# Patient Record
Sex: MALE | Race: WHITE | URBAN - METROPOLITAN AREA
[De-identification: names, ages, dates, MRNs, and addresses within clinical notes are randomized per-mention and may not be internally consistent; named-entity substitution may affect disease eponyms.]

---

## 2024-11-05 ENCOUNTER — TELEPHONE (OUTPATIENT)
Dept: INTERNAL MEDICINE CLINIC | Age: 49
End: 2024-11-05

## 2024-11-05 NOTE — TELEPHONE ENCOUNTER
----- Message from Inessa ANN sent at 11/4/2024  2:51 PM EST -----  Regarding: [facility] Appointment Request  [facility] Appointment Request    Patient needs appointment for [facility] Appointment Type: New Patient.    Patient Requested Dates(s): Sooner availability  Patient Requested Time: Early morning or later afternoon   Provider Name: Dr Javier Abebe MD    Reason for Appointment Request: New Patient - Requested Provider unavailable    Patient is requesting to set an appointment to Dr. Abebe and get establish  --------------------------------------------------------------------------------------------------------------------------    Relationship to Patient: Self     Call Back Information: OK to leave message on voicemail  Preferred Call Back Number: Phone [phone]

## 2024-11-05 NOTE — TELEPHONE ENCOUNTER
Called patient back and left a voicemail that the only doctors accepting new patients are Dr Armando and Dr Pérez. Encouraged him to call us to schedule if he wants to see one of them.